# Patient Record
Sex: MALE | Race: OTHER | Employment: FULL TIME | URBAN - METROPOLITAN AREA
[De-identification: names, ages, dates, MRNs, and addresses within clinical notes are randomized per-mention and may not be internally consistent; named-entity substitution may affect disease eponyms.]

---

## 2017-01-30 ENCOUNTER — HOSPITAL ENCOUNTER (OUTPATIENT)
Age: 27
Discharge: HOME | End: 2017-01-30
Attending: EMERGENCY MEDICINE
Payer: MEDICAID

## 2017-01-30 VITALS
OXYGEN SATURATION: 99 % | HEART RATE: 82 BPM | TEMPERATURE: 98 F | HEIGHT: 71 IN | BODY MASS INDEX: 31.5 KG/M2 | DIASTOLIC BLOOD PRESSURE: 96 MMHG | SYSTOLIC BLOOD PRESSURE: 144 MMHG | RESPIRATION RATE: 16 BRPM | WEIGHT: 225 LBS

## 2017-01-30 DIAGNOSIS — S61.011A THUMB LACERATION, RIGHT, INITIAL ENCOUNTER: Primary | ICD-10-CM

## 2017-01-30 PROCEDURE — 99203 OFFICE O/P NEW LOW 30 MIN: CPT

## 2017-01-30 PROCEDURE — 90471 IMMUNIZATION ADMIN: CPT

## 2017-01-31 NOTE — ED PROVIDER NOTES
Patient Seen in: [facility] Immediate Care In [address] [place] [address]    History   Patient presents with:  Laceration Abrasion (integumentary)    Stated Complaint: thomb laceration    HPI    By 22 hours ago patient cut his thumb while washing dishes.   Patient's Musculoskeletal: Normal range of motion. He exhibits no edema. Right hand: He exhibits normal range of motion, no tenderness, no bony tenderness, normal capillary refill, no deformity and no laceration. Normal strength noted.         Hands:  Neurolog

## 2017-01-31 NOTE — ED INITIAL ASSESSMENT (HPI)
Right thumb laceration sustained at midnite  Remain to bleed  Jagged cut doing dishes  Pt is right hand dominant

## (undated) NOTE — ED AVS SNAPSHOT
[facility] Immediate Care in [facility] [address].  [address] [facility] [place]    Phone:  [phone]    Fax:  [phone]           Nunu Gallo   MRN: [id number]    Department:  [facility] Immediate Care in [address] [place] [address]   Date of Visit:  1 may not be covered by your plan. It is possible that the physician may not participate in your health insurance plan. This may result in a lower benefit level being available to you or other limited reimbursement.   The physician may seek payment directly If you have been prescribed any medication(s), please fill your prescription right away and begin taking the medication(s) as directed.   If you believe that any of the medications or instructions on this list is different from what your Primary Care doctor Patient [address] to help you get signed up for insurance coverage. Patient [address] is a Federal Navigator program that can help with your Affordable Care Act coverage, as well as all types of Medicaid plans.   To get signed up and covere